# Patient Record
Sex: FEMALE | Race: WHITE | NOT HISPANIC OR LATINO | Employment: FULL TIME | URBAN - METROPOLITAN AREA
[De-identification: names, ages, dates, MRNs, and addresses within clinical notes are randomized per-mention and may not be internally consistent; named-entity substitution may affect disease eponyms.]

---

## 2017-04-13 ENCOUNTER — HOSPITAL ENCOUNTER (EMERGENCY)
Facility: OTHER | Age: 22
Discharge: HOME | End: 2017-04-13
Attending: EMERGENCY MEDICINE
Payer: MEDICAID

## 2017-04-13 VITALS
DIASTOLIC BLOOD PRESSURE: 68 MMHG | RESPIRATION RATE: 20 BRPM | WEIGHT: 190 LBS | SYSTOLIC BLOOD PRESSURE: 110 MMHG | HEART RATE: 70 BPM | OXYGEN SATURATION: 100 % | TEMPERATURE: 99 F | BODY MASS INDEX: 38.38 KG/M2

## 2017-04-13 DIAGNOSIS — R51.9 ACUTE NONINTRACTABLE HEADACHE, UNSPECIFIED HEADACHE TYPE: ICD-10-CM

## 2017-04-13 DIAGNOSIS — H53.143 PHOTOPHOBIA OF BOTH EYES: Primary | ICD-10-CM

## 2017-04-13 LAB
B-HCG UR QL: NEGATIVE
CTP QC/QA: YES

## 2017-04-13 PROCEDURE — 63600175 PHARM REV CODE 636 W HCPCS: Performed by: EMERGENCY MEDICINE

## 2017-04-13 PROCEDURE — 99284 EMERGENCY DEPT VISIT MOD MDM: CPT | Mod: 25

## 2017-04-13 PROCEDURE — 96372 THER/PROPH/DIAG INJ SC/IM: CPT

## 2017-04-13 PROCEDURE — 81025 URINE PREGNANCY TEST: CPT | Performed by: EMERGENCY MEDICINE

## 2017-04-13 RX ORDER — DIPHENHYDRAMINE HYDROCHLORIDE 50 MG/ML
25 INJECTION INTRAMUSCULAR; INTRAVENOUS
Status: COMPLETED | OUTPATIENT
Start: 2017-04-13 | End: 2017-04-13

## 2017-04-13 RX ORDER — PROMETHAZINE HYDROCHLORIDE 25 MG/1
25 TABLET ORAL EVERY 6 HOURS PRN
Qty: 15 TABLET | Refills: 0 | Status: SHIPPED | OUTPATIENT
Start: 2017-04-13

## 2017-04-13 RX ORDER — IBUPROFEN 600 MG/1
600 TABLET ORAL EVERY 6 HOURS PRN
Qty: 20 TABLET | Refills: 0 | Status: SHIPPED | OUTPATIENT
Start: 2017-04-13

## 2017-04-13 RX ORDER — KETOROLAC TROMETHAMINE 30 MG/ML
60 INJECTION, SOLUTION INTRAMUSCULAR; INTRAVENOUS
Status: COMPLETED | OUTPATIENT
Start: 2017-04-13 | End: 2017-04-13

## 2017-04-13 RX ORDER — DIPHENHYDRAMINE HCL 25 MG
25 CAPSULE ORAL EVERY 6 HOURS PRN
Qty: 20 CAPSULE | Refills: 0 | Status: SHIPPED | OUTPATIENT
Start: 2017-04-13

## 2017-04-13 RX ORDER — MECLIZINE HYDROCHLORIDE 25 MG/1
25 TABLET ORAL 3 TIMES DAILY PRN
Qty: 20 TABLET | Refills: 0 | Status: SHIPPED | OUTPATIENT
Start: 2017-04-13

## 2017-04-13 RX ORDER — PROCHLORPERAZINE EDISYLATE 5 MG/ML
10 INJECTION INTRAMUSCULAR; INTRAVENOUS ONCE
Status: COMPLETED | OUTPATIENT
Start: 2017-04-13 | End: 2017-04-13

## 2017-04-13 RX ADMIN — DIPHENHYDRAMINE HYDROCHLORIDE 25 MG: 50 INJECTION, SOLUTION INTRAMUSCULAR; INTRAVENOUS at 05:04

## 2017-04-13 RX ADMIN — KETOROLAC TROMETHAMINE 60 MG: 30 INJECTION, SOLUTION INTRAMUSCULAR at 05:04

## 2017-04-13 RX ADMIN — PROCHLORPERAZINE EDISYLATE 10 MG: 5 INJECTION INTRAMUSCULAR; INTRAVENOUS at 05:04

## 2017-04-13 NOTE — ED AVS SNAPSHOT
[facility] EMERGENCY DEPARTMENT  [address] [facility] [address]               Cory Syed   2017  4:45 PM   ED    Description:  Female : 1995   Department:  [facility] Emergency Department           Your Care was Coordinated By:     Provider Role From To    Shari Parada DO Attending Provider 17 [id number] --      Reason for Visit     Eye Pain           Diagnoses this Visit        Comments    Photophobia of both eyes    -  Primary     Acute nonintractable headache, unspecified headache type           ED Disposition     ED Disposition Condition Comment    Discharge             To Do List           Follow-up Information     Follow up with Candis Price MD. Schedule an appointment as soon as possible for a visit in 1 day.    Specialty:  Ophthalmology    Contact information:    [address]  [facility] [address]  [phone]          Follow up with Az Rodriguez MD. Schedule an appointment as soon as possible for a visit in 1 day.    Specialty:  General Practice    Contact information:    [address] [facility] [address]  [phone]          Follow up with [facility] Emergency Department.    Specialty:  Emergency Medicine    Why:  If symptoms worsen    Contact information:    [address] [facility] [address]  [phone]       These Medications        Disp Refills Start End    ibuprofen (ADVIL,MOTRIN) 600 MG tablet 20 tablet 0 2017     Take 1 tablet (600 mg total) by mouth every 6 (six) hours as needed for Pain (Take with food as needed for mild-to-moderate pain). - Oral    diphenhydrAMINE (BENADRYL) 25 mg capsule 20 capsule 0 2017     Take 1 each (25 mg total) by mouth every 6 (six) hours as needed for Itching or Allergies. - Oral    promethazine (PHENERGAN) 25 MG tablet 15 tablet 0 2017     Take 1 tablet (25 mg total) by mouth every 6 (six) hours as needed for Nausea. - Oral    meclizine (ANTIVERT) 25 mg tablet 20 tablet 0  4/13/2017     Take 1 tablet (25 mg total) by mouth 3 (three) times daily as needed. - Oral      [facility]     [facility] Care [facility] - 24/7 Assistance  Unless otherwise directed by your provider, please contact [facility], our nurse care line that is available for 24/7 assistance.     Registered nurses in the [facility] provide: appointment scheduling, clinical advisement, health education, and other advisory services.  Call: [phone] (toll free)               Medications           Message regarding Medications     Verify the changes and/or additions to your medication regime listed below are the same as discussed with your clinician today.  If any of these changes or additions are incorrect, please notify your healthcare provider.        START taking these NEW medications        Refills    ibuprofen (ADVIL,MOTRIN) 600 MG tablet 0    Sig: Take 1 tablet (600 mg total) by mouth every 6 (six) hours as needed for Pain (Take with food as needed for mild-to-moderate pain).    Class: Print    Route: Oral    diphenhydrAMINE (BENADRYL) 25 mg capsule 0    Sig: Take 1 each (25 mg total) by mouth every 6 (six) hours as needed for Itching or Allergies.    Class: Print    Route: Oral    promethazine (PHENERGAN) 25 MG tablet 0    Sig: Take 1 tablet (25 mg total) by mouth every 6 (six) hours as needed for Nausea.    Class: Print    Route: Oral    meclizine (ANTIVERT) 25 mg tablet 0    Sig: Take 1 tablet (25 mg total) by mouth 3 (three) times daily as needed.    Class: Print    Route: Oral      These medications were administered today        Dose Freq    ketorolac injection 60 mg 60 mg ED 1 Time    Sig: Inject 60 mg into the muscle ED 1 Time.    Class: Normal    Route: Intramuscular    prochlorperazine injection Soln 10 mg 10 mg Once    Sig: Inject 2 mLs (10 mg total) into the muscle once.    Class: Normal    Route: Intramuscular    diphenhydrAMINE injection 25 mg 25 mg ED 1 Time    Sig: Inject  0.5 mLs (25 mg total) into the muscle ED 1 Time.    Class: Normal    Route: Intramuscular           Verify that the below list of medications is an accurate representation of the medications you are currently taking.  If none reported, the list may be blank. If incorrect, please contact your healthcare provider. Carry this list with you in case of emergency.           Current Medications     diphenhydrAMINE (BENADRYL) 25 mg capsule Take 1 each (25 mg total) by mouth every 6 (six) hours as needed for Itching or Allergies.    ibuprofen (ADVIL,MOTRIN) 600 MG tablet Take 1 tablet (600 mg total) by mouth every 6 (six) hours as needed for Pain (Take with food as needed for mild-to-moderate pain).    meclizine (ANTIVERT) 25 mg tablet Take 1 tablet (25 mg total) by mouth 3 (three) times daily as needed.    promethazine (PHENERGAN) 25 MG tablet Take 1 tablet (25 mg total) by mouth every 6 (six) hours as needed for Nausea.           Clinical Reference Information           Your Vitals Were     BP Pulse Temp Resp Weight Last Period    135/87 90 98.7 °F (37.1 °C) 18 86.2 kg (190 lb) 03/30/2017    SpO2 BMI             100% 38.38 kg/m2         Allergies as of 4/13/2017        Reactions    Tramadol Nausea Only      Immunizations Administered on Date of Encounter - 4/13/2017     None      ED Micro, Lab, POCT     Start Ordered       Status Ordering Provider    04/13/17 1653 04/13/17 1652  POCT urine pregnancy  Once      Final result       ED Imaging Orders     Start Ordered       Status Ordering Provider    04/13/17 1653 04/13/17 1652  CT Head Without Contrast  1 time imaging      Final result         Discharge Instructions         Dizziness (Vertigo) and Balance Problems: Diagnostic Tests    An otolaryngologist (also called an ENT) is a doctor who specializes in disorders of the ear, nose, and throat. Your ENT can help find clues to the cause of your dizziness. He or she will examine you and go over your health history. Your ENT  may also order certain tests to help diagnose your problem.  Hearing testing  In most cases, you will be referred for hearing testing. This is because the nerve that sends balance signals also sends hearing signals. A problem that affects balance can also affect hearing.  Other tests  Your doctor may recommend more than one kind of test. The following tests are painless, but may cause dizziness in some cases.  · MRI creates images of the ear or head. A magnetic field and contrast medium are used to make the image.  · Electronystagmography (ENG) records eye movement. Small electrodes are put on the skin around your eyes. Then your ear is filled with warm or cold water.  · Rotation tests show the relationship between the inner ear and your eyes. You may be asked to wear special goggles or sit in a computerized chair.  · Posturography tests your standing balance under different conditions. You will stand on a platform that measures shifts in your body weight.   · Electrocochleography (ECoG) measures the fluid pressure in the inner ear. An abnormal ECoG may mean you have Meniere's disease or other conditions.  · Vestibular evoked myogenic potentials (VEMPs) may be used if your healthcare provider suspects a rare condition like superior semicircular canal dehiscence. Electrodes are placed on your neck, and you hear clicks in your ear.  Date Last Reviewed: 11/1/2016 © 2000-2016 [employer] [address] [place] [address], [place] [address]. All rights reserved. This information is not intended as a substitute for professional medical care. Always follow your healthcare professional's instructions.        Headache, Unspecified    A number of things can cause headaches. The cause of your headache isnt clear. But it doesnt seem to be a sign of any serious illness.  You could have a tension headache or a migraine headache.  Stress can cause a tension headache. This can happen if you tense the muscles of your  "shoulders, neck, and scalp without knowing it. If this stress lasts long enough, you may develop a tension headache.  It is not clear why migraines occur, but certain things called" triggers" can raise the risk of having a migraine attack. Migraine triggers may include emotional stress or depression, or by hormone changes during the menstrual cycle. Other triggers include birth control pills and other medicines, alcohol or caffeine, foods with tyramine (such as aged cheese, wine), eyestrain, weather changes, missed meals, and lack of sleep or oversleeping.  Other causes of headache include:  · Viral illness with high fever  · Head injury with concussion  · Sinus, ear, or throat infection  · Dental pain and jaw joint (TMJ) pain  More serious but less common causes of headache include stroke, brain hemorrhage, brain tumor, meningitis, and encephalitis.  Home care  Follow these tips when taking care of yourself at home:  · Dont drive yourself home if you were given pain medicine for your headache. Instead, have someone else drive you home. Try to sleep when you get home. You should feel much better when you wake up.  · Apply heat to the back of your neck to ease a neck muscle spasm. Take care of a migraine headache by putting an ice pack on your forehead or at the base of your skull.  · If you have nausea or vomiting, eat a light diet until your headache eases.  · If you have a migraine headache, use sunglasses when in the daylight or around bright indoor lighting until your symptoms get better. Bright glaring light can make this type of headache worse.  Follow-up care  Follow up with your healthcare provider, or as advised. Talk with your provider if you have frequent headaches. He or she can help figure out a treatment plan. By knowing the earliest signs of headache, and starting treatment right away, you may be able to stop the pain yourself.  When to seek medical advice  Call your healthcare provider right away if " any of these occur:  · Your head pain suddenly gets worse after sexual intercourse or strenuous activity  · Your head pain doesnt get better within 24 hours  · You arent able to keep liquids down (repeated vomiting)  · Fever of 100.4ºF (38ºC) or higher, or as directed by your healthcare provider  · Stiff neck  · Extreme drowsiness, confusion, or fainting  · Dizziness or dizziness with spinning sensation (vertigo)  · Weakness in an arm or leg or one side of your face  · You have trouble talking or seeing  Date Last Reviewed: 8/1/2016 © 2000-2016 [employer]. [address] [place] [address], [place] [address]. All rights reserved. This information is not intended as a substitute for professional medical care. Always follow your healthcare professional's instructions.          Understanding Computer Vision Syndrome    Computer vision syndrome (CVS) is a group of eye problems. The problems can include eyes that itch and tear and are dry and red. Your eyes may feel tired. You may not be able to focus well. With CVS these problems are the result of a lot of computer use. Use of [employer] and smart phones may also cause these problems. CVS is very common. Both children and adults can have symptoms of CVS.  What causes computer vision syndrome?  Reading text on a screen is more difficult for the eyes than reading printed text. This is why working on a computer can cause eye problems, while reading a book may not. People also tend to blink less when using a computer than when reading printed text. This can cause dry eyes, which can also contribute to computer vision syndrome.  Many factors can lead to computer vision syndrome, such as:  · Spending several hours a day at the computer  · Vision problems (even minor ones) not corrected with lenses  · Wearing glasses not suitable for viewing your computer screen  · Poor posture while using the computer  · Poor lighting  · Glare from the computer screen  · Sitting too  close to the screen  · Positioning the screen at a wrong angle  · Not taking breaks while you are working  · Using an older-style monitor instead of a flat-screen monitor  Dry eye and CVS  Dry eye is a condition where you dont make enough tears to wet the eye. If you have dry eye, this can make CVS worse or more likely. Dry eye is more common in women, and with age. Some medicines and health problems make dry eye more likely. For example, using antihistamines may lead to dry eye. Thyroid disease and some autoimmune diseases may also lead to dry eye.  Symptoms of computer vision syndrome  Computer vision syndrome can cause symptoms such as:  · Tired eyes  · Eye discomfort  · Dry eye  · Red eyes  · Eye tearing  · Itchy eyes  · Blurred vision  · Double vision  · Headaches  Most of these symptoms last a short time, and lessen or go away when you stop using your computer. In some cases, symptoms may last for a longer time after using a computer.  Symptoms may be mild to severe. This depends on how long you use the computer and other eye problems you may have. Symptoms can get worse without treatment.  Computer use can also lead to neck and shoulder pain. This is often because you may have poor posture when using your computer. Some health care providers also consider these symptoms of CVS.  Diagnosing computer vision syndrome  An eye care health provider diagnoses CVS. He or she will ask about your symptoms and your medical history. Youll be given an eye exam. You may have tests to check the sharpness of your vision and how well your eyes focus and work together. Eye drops may be used to dilate your irises. This is to help the doctor see into your eye. He or she may use a tool called an ophthalmoscope to look at the back of your eye. You may also have blood tests. These are to check for health care problems that can cause dry eye and lead to CVS.  Date Last Reviewed: 3/19/2015  © 7718-8475 [employer]  [address] [place] [address]. All rights reserved. This information is not intended as a substitute for professional medical care. Always follow your healthcare professional's instructions.          [facility] Sign-Up     Activating your [facility] account is as easy as 1-2-3!     1) Visit [email or web address], select Sign Up Now, enter this activation code and your date of birth, then select Next.  [email or web address]  Expires: 5/28/2017  5:27 PM      2) Create a username and password to use when you visit [facility] in the future and select a security question in case you lose your password and select Next.    3) Enter your e-mail address and click Sign Up!    Additional Information  If you have questions, please e-mail [email or web address] or call [phone] to talk to our [facility] staff. Remember, [facility] is NOT to be used for urgent needs. For medical emergencies, dial 911.          [facility] Emergency Department complies with applicable Federal civil rights laws and does not discriminate on the basis of race, color, national origin, age, disability, or sex.        Language Assistance Services     ATTENTION: Language assistance services are available, free of charge. Please call [phone].      ATENCIÓN: Si habla español, tiene a catalan disposición servicios gratuitos de asistencia lingüística. Llame al [phone].     CHÚ Ý: N?u b?n nói Ti?ng Vi?t, có các d?ch v? h? tr? ngôn ng? mi?n phí dành cho b?n. G?i s? [phone].

## 2017-04-13 NOTE — DISCHARGE INSTRUCTIONS
Dizziness (Vertigo) and Balance Problems: Diagnostic Tests    An otolaryngologist (also called an ENT) is a doctor who specializes in disorders of the ear, nose, and throat. Your ENT can help find clues to the cause of your dizziness. He or she will examine you and go over your health history. Your ENT may also order certain tests to help diagnose your problem.  Hearing testing  In most cases, you will be referred for hearing testing. This is because the nerve that sends balance signals also sends hearing signals. A problem that affects balance can also affect hearing.  Other tests  Your doctor may recommend more than one kind of test. The following tests are painless, but may cause dizziness in some cases.  · MRI creates images of the ear or head. A magnetic field and contrast medium are used to make the image.  · Electronystagmography (ENG) records eye movement. Small electrodes are put on the skin around your eyes. Then your ear is filled with warm or cold water.  · Rotation tests show the relationship between the inner ear and your eyes. You may be asked to wear special goggles or sit in a computerized chair.  · Posturography tests your standing balance under different conditions. You will stand on a platform that measures shifts in your body weight.   · Electrocochleography (ECoG) measures the fluid pressure in the inner ear. An abnormal ECoG may mean you have Meniere's disease or other conditions.  · Vestibular evoked myogenic potentials (VEMPs) may be used if your healthcare provider suspects a rare condition like superior semicircular canal dehiscence. Electrodes are placed on your neck, and you hear clicks in your ear.  Date Last Reviewed: 11/1/2016 © 2000-2016 [facility]. [address] [place] [address], [place] [address]. All rights reserved. This information is not intended as a substitute for professional medical care. Always follow your healthcare professional's instructions.        Headache,  "Unspecified    A number of things can cause headaches. The cause of your headache isnt clear. But it doesnt seem to be a sign of any serious illness.  You could have a tension headache or a migraine headache.  Stress can cause a tension headache. This can happen if you tense the muscles of your shoulders, neck, and scalp without knowing it. If this stress lasts long enough, you may develop a tension headache.  It is not clear why migraines occur, but certain things called" triggers" can raise the risk of having a migraine attack. Migraine triggers may include emotional stress or depression, or by hormone changes during the menstrual cycle. Other triggers include birth control pills and other medicines, alcohol or caffeine, foods with tyramine (such as aged cheese, wine), eyestrain, weather changes, missed meals, and lack of sleep or oversleeping.  Other causes of headache include:  · Viral illness with high fever  · Head injury with concussion  · Sinus, ear, or throat infection  · Dental pain and jaw joint (TMJ) pain  More serious but less common causes of headache include stroke, brain hemorrhage, brain tumor, meningitis, and encephalitis.  Home care  Follow these tips when taking care of yourself at home:  · Dont drive yourself home if you were given pain medicine for your headache. Instead, have someone else drive you home. Try to sleep when you get home. You should feel much better when you wake up.  · Apply heat to the back of your neck to ease a neck muscle spasm. Take care of a migraine headache by putting an ice pack on your forehead or at the base of your skull.  · If you have nausea or vomiting, eat a light diet until your headache eases.  · If you have a migraine headache, use sunglasses when in the daylight or around bright indoor lighting until your symptoms get better. Bright glaring light can make this type of headache worse.  Follow-up care  Follow up with your healthcare provider, or as advised. " Talk with your provider if you have frequent headaches. He or she can help figure out a treatment plan. By knowing the earliest signs of headache, and starting treatment right away, you may be able to stop the pain yourself.  When to seek medical advice  Call your healthcare provider right away if any of these occur:  · Your head pain suddenly gets worse after sexual intercourse or strenuous activity  · Your head pain doesnt get better within 24 hours  · You arent able to keep liquids down (repeated vomiting)  · Fever of 100.4ºF (38ºC) or higher, or as directed by your healthcare provider  · Stiff neck  · Extreme drowsiness, confusion, or fainting  · Dizziness or dizziness with spinning sensation (vertigo)  · Weakness in an arm or leg or one side of your face  · You have trouble talking or seeing  Date Last Reviewed: 8/1/2016  © 7462-3553 [employer]. [address] [place] [address]. All rights reserved. This information is not intended as a substitute for professional medical care. Always follow your healthcare professional's instructions.          Understanding Computer Vision Syndrome    Computer vision syndrome (CVS) is a group of eye problems. The problems can include eyes that itch and tear and are dry and red. Your eyes may feel tired. You may not be able to focus well. With CVS these problems are the result of a lot of computer use. Use of e-readers and smart phones may also cause these problems. CVS is very common. Both children and adults can have symptoms of CVS.  What causes computer vision syndrome?  Reading text on a screen is more difficult for the eyes than reading printed text. This is why working on a computer can cause eye problems, while reading a book may not. People also tend to blink less when using a computer than when reading printed text. This can cause dry eyes, which can also contribute to computer vision syndrome.  Many factors can lead to computer vision  syndrome, such as:  · Spending several hours a day at the computer  · Vision problems (even minor ones) not corrected with lenses  · Wearing glasses not suitable for viewing your computer screen  · Poor posture while using the computer  · Poor lighting  · Glare from the computer screen  · Sitting too close to the screen  · Positioning the screen at a wrong angle  · Not taking breaks while you are working  · Using an older-style monitor instead of a flat-screen monitor  Dry eye and CVS  Dry eye is a condition where you dont make enough tears to wet the eye. If you have dry eye, this can make CVS worse or more likely. Dry eye is more common in women, and with age. Some medicines and health problems make dry eye more likely. For example, using antihistamines may lead to dry eye. Thyroid disease and some autoimmune diseases may also lead to dry eye.  Symptoms of computer vision syndrome  Computer vision syndrome can cause symptoms such as:  · Tired eyes  · Eye discomfort  · Dry eye  · Red eyes  · Eye tearing  · Itchy eyes  · Blurred vision  · Double vision  · Headaches  Most of these symptoms last a short time, and lessen or go away when you stop using your computer. In some cases, symptoms may last for a longer time after using a computer.  Symptoms may be mild to severe. This depends on how long you use the computer and other eye problems you may have. Symptoms can get worse without treatment.  Computer use can also lead to neck and shoulder pain. This is often because you may have poor posture when using your computer. Some health care providers also consider these symptoms of CVS.  Diagnosing computer vision syndrome  An eye care health provider diagnoses CVS. He or she will ask about your symptoms and your medical history. Youll be given an eye exam. You may have tests to check the sharpness of your vision and how well your eyes focus and work together. Eye drops may be used to dilate your irises. This is to help  the doctor see into your eye. He or she may use a tool called an ophthalmoscope to look at the back of your eye. You may also have blood tests. These are to check for health care problems that can cause dry eye and lead to CVS.  Date Last Reviewed: 3/19/2015  © 1526-5813 [email or web address]. [address] [place] [address], [place] [address]. All rights reserved. This information is not intended as a substitute for professional medical care. Always follow your healthcare professional's instructions.

## 2017-04-13 NOTE — ED PROVIDER NOTES
Encounter Date: 4/13/2017       History     Chief Complaint   Patient presents with    Eye Pain     states her eyes are sensitive to light, states she feels nausea and dizzy     Review of patient's allergies indicates:   Allergen Reactions    Tramadol Nausea Only     HPI Comments: Cory Syed is a 21 y.o. female who presents to the Emergency Department with   headache with photosensitivity.  Patient states her eyes are very sensitive to light and she keeps seeing squiggles in her eye sight.  She also feels nauseated and dizzy.  Patient states she's had headaches before that she's been sensitive to light but she hasn't seen the school goals or felt dizzy before.  Patient states her pain goes away when she goes to sleep.  Patient took ibuprofen for pain with mild relief.  Patient states she takes ibuprofen regularly for her knee pain.  Patient also recently finished a steroid Dosepak to treat the inflammation in her knee    Patient is a 21 y.o. female presenting with the following complaint: headaches. The history is provided by the patient and a relative.   Headache    This is a new problem. The current episode started yesterday. The problem occurs constantly. The problem has been unchanged. The pain is located in the frontal region. The pain does not radiate. The pain quality is similar to prior headaches. The quality of the pain is described as aching. Associated symptoms include dizziness and photophobia. Pertinent negatives include no back pain, eye pain, eye redness, fever, nausea, sore throat or weakness. The symptoms are aggravated by bright light. She has tried NSAIDs for the symptoms. The treatment provided mild relief. Her past medical history is significant for migraine headaches, migraines in the family and obesity. There is no history of cluster headaches, hypertension or recent head traumas.     History reviewed. No pertinent past medical history.  Past Surgical History:   Procedure Laterality Date     KNEE SURGERY Left      History reviewed. No pertinent family history.  Social History   Substance Use Topics    Smoking status: Never Smoker    Smokeless tobacco: None    Alcohol use No     Review of Systems   Constitutional: Negative for fever.   HENT: Negative for sore throat.    Eyes: Positive for photophobia. Negative for pain, discharge and redness.   Respiratory: Negative for shortness of breath.    Cardiovascular: Negative for chest pain.   Gastrointestinal: Negative for nausea.   Genitourinary: Negative for dysuria.   Musculoskeletal: Positive for arthralgias. Negative for back pain.   Skin: Negative for rash.   Neurological: Positive for dizziness and headaches. Negative for weakness.   Hematological: Does not bruise/bleed easily.   All other systems reviewed and are negative.      Physical Exam   Initial Vitals   BP Pulse Resp Temp SpO2   04/13/17 1650 04/13/17 1650 04/13/17 1650 04/13/17 1650 04/13/17 1650   135/87 90 18 98.7 °F (37.1 °C) 100 %     Physical Exam    Nursing note and vitals reviewed.  Constitutional: She appears well-developed and well-nourished. She is not diaphoretic. No distress.   HENT:   Head: Normocephalic and atraumatic.   Right Ear: External ear normal.   Left Ear: External ear normal.   Nose: Nose normal.   Mouth/Throat: Oropharynx is clear and moist.   Eyes: Conjunctivae and EOM are normal. Pupils are equal, round, and reactive to light. Right eye exhibits no discharge. Left eye exhibits no discharge.   Neck: Normal range of motion. Neck supple. No JVD present.   Cardiovascular: Normal rate, regular rhythm, normal heart sounds and intact distal pulses. Exam reveals no gallop and no friction rub.    No murmur heard.  Pulmonary/Chest: Breath sounds normal. No respiratory distress. She has no wheezes. She has no rhonchi. She has no rales. She exhibits no tenderness.   Abdominal: Soft. Bowel sounds are normal. She exhibits no distension. There is no tenderness. There is no  rebound.   Musculoskeletal: Normal range of motion. She exhibits no edema or tenderness.   Lymphadenopathy:     She has no cervical adenopathy.   Neurological: She is alert and oriented to person, place, and time. She has normal strength and normal reflexes. She displays normal reflexes. No cranial nerve deficit or sensory deficit.   Skin: Skin is warm and dry. No rash noted. No pallor.   Psychiatric: She has a normal mood and affect.         ED Course   Procedures  Labs Reviewed - No data to display     Imaging Results         CT Head Without Contrast (Final result) Result time:  04/13/17 17:21:16    Final result by Interface, Rad Results In (04/13/17 17:21:16)    Narrative:    CT HEAD WITHOUT CONTRAST     HISTORY: Headache and photosensitivity.     COMPARISON: None.     TECHNIQUE: CT of the head was performed without administration of intravenous contrast.    Coronal and sagittal reconstructions were obtained.     Total DLP = 738.58 mGy-cm     FINDINGS:     No acute intracranial abnormality is identified such as acute ischemia, intraparenchymal   hemorrhage, extra-axial hematoma/fluid collection, midline shift, mass effect,   herniation, cerebral edema, or ventriculomegaly.     Gray-white matter differentiation is preserved.  The ventricles are normal in size and   shape.  The sulcal pattern is normal and the basal cisterns are patent.     Paranasal sinuses and air cells are well-pneumatized.     No asymmetric scalp swelling.  No skull fracture.     If there is continued clinical concern, MRI should be performed for further assessment.     IMPRESSION:  1.  No CT evidence of acute intracranial or extra-axial process.     SL: 24 ;  Signed by: Alana Quinn MD, MPH  2017-04-13 17:21:15                                   ED Course       CC headache with photophobia, nausea, and dizziness  DDx migraine headache, tension headache, cluster headache, muscle spasm, and vertigo  Treatment in the ED physical exam, Toradol  for pain, Benadryl, and Compazine.  Patient reports total resolution of symptoms after medication.    Discussed outpatient treatment plan, need for follow-up evaluation with primary care in one day, and imaging results.    Fill and take prescriptions ibuprofen, Benadryl, Phenergan, and meclizine as directed.  Answered questions and discussed discharge plan.    Return to the emergency room is symptoms worsen or do not improve    Clinical Impression:   The primary encounter diagnosis was Photophobia of both eyes. A diagnosis of Acute nonintractable headache, unspecified headache type was also pertinent to this visit.          Shari Parada DO  04/13/17 1938